# Patient Record
Sex: MALE | Race: WHITE | Employment: STUDENT | URBAN - METROPOLITAN AREA
[De-identification: names, ages, dates, MRNs, and addresses within clinical notes are randomized per-mention and may not be internally consistent; named-entity substitution may affect disease eponyms.]

---
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
## 2019-06-04 ENCOUNTER — HOSPITAL ENCOUNTER (EMERGENCY)
Age: 19
Discharge: HOME | End: 2019-06-04
Payer: COMMERCIAL

## 2019-06-04 VITALS
WEIGHT: 171 LBS | DIASTOLIC BLOOD PRESSURE: 76 MMHG | SYSTOLIC BLOOD PRESSURE: 134 MMHG | RESPIRATION RATE: 16 BRPM | TEMPERATURE: 99.3 F | HEART RATE: 112 BPM | OXYGEN SATURATION: 96 %

## 2019-06-04 DIAGNOSIS — J06.9 UPPER RESPIRATORY TRACT INFECTION, UNSPECIFIED TYPE: Primary | ICD-10-CM

## 2019-06-04 LAB
GROUP A STREP CULTURE, REFLEX: NEGATIVE
REFLEX THROAT C + S: NORMAL

## 2019-06-04 PROCEDURE — 99203 OFFICE O/P NEW LOW 30 MIN: CPT

## 2019-06-04 PROCEDURE — 87070 CULTURE OTHR SPECIMN AEROBIC: CPT

## 2019-06-04 PROCEDURE — 99203 OFFICE O/P NEW LOW 30 MIN: CPT | Performed by: NURSE PRACTITIONER

## 2019-06-04 PROCEDURE — 87880 STREP A ASSAY W/OPTIC: CPT

## 2019-06-04 RX ORDER — PREDNISOLONE 15 MG/5ML
20 SOLUTION ORAL DAILY
Qty: 33.5 ML | Refills: 0 | Status: SHIPPED | OUTPATIENT
Start: 2019-06-04 | End: 2019-06-09

## 2019-06-04 RX ORDER — CEFDINIR 250 MG/5ML
300 POWDER, FOR SUSPENSION ORAL 2 TIMES DAILY
Qty: 120 ML | Refills: 0 | Status: SHIPPED | OUTPATIENT
Start: 2019-06-04 | End: 2019-06-14

## 2019-06-04 SDOH — HEALTH STABILITY: MENTAL HEALTH: HOW OFTEN DO YOU HAVE A DRINK CONTAINING ALCOHOL?: NEVER

## 2019-06-04 ASSESSMENT — ENCOUNTER SYMPTOMS
VOMITING: 0
EYE REDNESS: 0
SORE THROAT: 1
COUGH: 1
EYE DISCHARGE: 0
NAUSEA: 0
SHORTNESS OF BREATH: 0

## 2019-06-04 ASSESSMENT — PAIN DESCRIPTION - LOCATION: LOCATION: THROAT

## 2019-06-04 ASSESSMENT — PAIN SCALES - GENERAL: PAINLEVEL_OUTOF10: 6

## 2019-06-04 ASSESSMENT — PAIN DESCRIPTION - PAIN TYPE: TYPE: ACUTE PAIN

## 2019-06-04 NOTE — ED PROVIDER NOTES
PROCEDURES:  None    FINAL IMPRESSION      1. Upper respiratory tract infection, unspecified type          DISPOSITION/ PLAN     Strep swab is negative at this time. I discussed the patient that this is likely an upper respiratory infection, however we will plan treat with oral antibiotics for his slight tonsillitis and erythematous throat. Patient will be placed on liquid medications as he states that he is unable to swallow pills. He is advised to use Tylenol and Motrin at home as needed and is agreeable plan as discussed. PATIENT REFERRED TO:  No primary care provider on file. No primary physician on file.       DISCHARGE MEDICATIONS:  New Prescriptions    CEFDINIR (OMNICEF) 250 MG/5ML SUSPENSION    Take 6 mLs by mouth 2 times daily for 10 days    PREDNISOLONE 15 MG/5ML SOLUTION    Take 6.7 mLs by mouth daily for 5 days       Discontinued Medications    No medications on file       Current Discharge Medication List          VARINDER Ferris CNP    (Please note that portions of this note were completed with a voice recognition program. Efforts were made to edit the dictations but occasionally words are mis-transcribed.)          VARINDER Ferris CNP  06/04/19 3653

## 2019-06-04 NOTE — ED TRIAGE NOTES
Pt walked to room 4. Pt here with complaints of a sore throat. Pt thinks strep. Started yesterday morning.

## 2019-06-06 LAB — THROAT/NOSE CULTURE: NORMAL

## 2021-12-29 ENCOUNTER — HOSPITAL ENCOUNTER (EMERGENCY)
Facility: HOSPITAL | Age: 21
Discharge: HOME | End: 2021-12-29
Attending: EMERGENCY MEDICINE
Payer: COMMERCIAL

## 2021-12-29 VITALS
HEIGHT: 69 IN | WEIGHT: 183 LBS | BODY MASS INDEX: 27.11 KG/M2 | OXYGEN SATURATION: 98 % | SYSTOLIC BLOOD PRESSURE: 134 MMHG | HEART RATE: 82 BPM | DIASTOLIC BLOOD PRESSURE: 86 MMHG | TEMPERATURE: 98 F | RESPIRATION RATE: 16 BRPM

## 2021-12-29 DIAGNOSIS — F40.10 SOCIAL ANXIETY DISORDER: ICD-10-CM

## 2021-12-29 DIAGNOSIS — F32.A DEPRESSION, UNSPECIFIED DEPRESSION TYPE: Primary | ICD-10-CM

## 2021-12-29 LAB
AMPHET UR QL SCN: NEGATIVE
AMPHET UR QL SCN: NEGATIVE
ANION GAP SERPL CALC-SCNC: 3 MMOL/L (ref 0–18)
BARBITURATES UR QL SCN: NEGATIVE
BASOPHILS # BLD AUTO: 0.02 X10(3) UL (ref 0–0.2)
BASOPHILS NFR BLD AUTO: 0.6 %
BENZODIAZ UR QL SCN: NEGATIVE
BENZODIAZ UR QL SCN: NEGATIVE
BUN BLD-MCNC: 12 MG/DL (ref 7–18)
BUN/CREAT SERPL: 13.2 (ref 10–20)
CALCIUM BLD-MCNC: 9.6 MG/DL (ref 8.5–10.1)
CANNABINOIDS UR QL SCN: NEGATIVE
CANNABINOIDS UR QL SCN: NEGATIVE
CHLORIDE SERPL-SCNC: 103 MMOL/L (ref 98–112)
CO2 SERPL-SCNC: 32 MMOL/L (ref 21–32)
COCAINE UR QL: NEGATIVE
COCAINE UR QL: NEGATIVE
CREAT BLD-MCNC: 0.91 MG/DL
CREAT UR-SCNC: 287 MG/DL
CREAT UR-SCNC: 297 MG/DL
DEPRECATED RDW RBC AUTO: 39.7 FL (ref 35.1–46.3)
EOSINOPHIL # BLD AUTO: 0.06 X10(3) UL (ref 0–0.7)
EOSINOPHIL NFR BLD AUTO: 1.7 %
ERYTHROCYTE [DISTWIDTH] IN BLOOD BY AUTOMATED COUNT: 11.6 % (ref 11–15)
ETHANOL SERPL-MCNC: <3 MG/DL (ref ?–3)
FLUAV + FLUBV RNA SPEC NAA+PROBE: NEGATIVE
FLUAV + FLUBV RNA SPEC NAA+PROBE: NEGATIVE
GLUCOSE BLD-MCNC: 90 MG/DL (ref 70–99)
HCT VFR BLD AUTO: 47.4 %
HGB BLD-MCNC: 16.4 G/DL
IMM GRANULOCYTES # BLD AUTO: 0.01 X10(3) UL (ref 0–1)
IMM GRANULOCYTES NFR BLD: 0.3 %
LYMPHOCYTES # BLD AUTO: 0.99 X10(3) UL (ref 1–4)
LYMPHOCYTES NFR BLD AUTO: 28.6 %
MCH RBC QN AUTO: 32 PG (ref 26–34)
MCHC RBC AUTO-ENTMCNC: 34.6 G/DL (ref 31–37)
MCV RBC AUTO: 92.6 FL
MDMA UR QL SCN: NEGATIVE
MDMA UR QL SCN: NEGATIVE
METHADONE UR QL SCN: NEGATIVE
MONOCYTES # BLD AUTO: 1.16 X10(3) UL (ref 0.1–1)
MONOCYTES NFR BLD AUTO: 33.5 %
NEUTROPHILS # BLD AUTO: 1.22 X10 (3) UL (ref 1.5–7.7)
NEUTROPHILS # BLD AUTO: 1.22 X10(3) UL (ref 1.5–7.7)
NEUTROPHILS NFR BLD AUTO: 35.3 %
OPIATES UR QL SCN: NEGATIVE
OPIATES UR QL SCN: NEGATIVE
OSMOLALITY SERPL CALC.SUM OF ELEC: 285 MOSM/KG (ref 275–295)
OXYCODONE UR QL SCN: NEGATIVE
OXYCODONE UR QL SCN: NEGATIVE
PCP UR QL SCN: NEGATIVE
PLATELET # BLD AUTO: 218 10(3)UL (ref 150–450)
POTASSIUM SERPL-SCNC: 3.7 MMOL/L (ref 3.5–5.1)
RBC # BLD AUTO: 5.12 X10(6)UL
RSV RNA SPEC NAA+PROBE: NEGATIVE
SARS-COV-2 RNA RESP QL NAA+PROBE: NOT DETECTED
SODIUM SERPL-SCNC: 138 MMOL/L (ref 136–145)
WBC # BLD AUTO: 3.5 X10(3) UL (ref 4–11)

## 2021-12-29 PROCEDURE — 80048 BASIC METABOLIC PNL TOTAL CA: CPT | Performed by: EMERGENCY MEDICINE

## 2021-12-29 PROCEDURE — 80307 DRUG TEST PRSMV CHEM ANLYZR: CPT | Performed by: EMERGENCY MEDICINE

## 2021-12-29 PROCEDURE — 0241U SARS-COV-2/FLU A AND B/RSV BY PCR (GENEXPERT): CPT | Performed by: EMERGENCY MEDICINE

## 2021-12-29 PROCEDURE — 99285 EMERGENCY DEPT VISIT HI MDM: CPT

## 2021-12-29 PROCEDURE — 36415 COLL VENOUS BLD VENIPUNCTURE: CPT

## 2021-12-29 PROCEDURE — 85025 COMPLETE CBC W/AUTO DIFF WBC: CPT | Performed by: EMERGENCY MEDICINE

## 2021-12-29 PROCEDURE — 82077 ASSAY SPEC XCP UR&BREATH IA: CPT | Performed by: EMERGENCY MEDICINE

## 2021-12-29 NOTE — ED PROVIDER NOTES
Patient Seen in: [facility] AND [facility] Emergency Department      History   Patient presents with:  Eval-P    Stated Complaint: eval-p    Subjective:   HPI    39-year-old male sent over by his primary doctor for further evaluation of increasing depressive tho Musculoskeletal: Normal range of motion. No edema or tenderness. Neurological: Alert and oriented to person, place, and time. Psychiatric: Slightly flat affect. He seems mildly depressed.   He does not voice suicidal ideation or thoughts at this time ------                     CBC W/ DIFFERENTIAL[[id number]]          Abnormal            Final result                 Please view results for these tests on the individual orders.    DRUG ABUSE PANEL 10 SCREEN                   MDM      Patient has been medic

## 2021-12-29 NOTE — ED NOTES
Taurus denied any suicidal plan, to writer, to Dr Paloma Cuellar and Dr. Dheeraj Major. [place] from [place], [address] [facility] that [facility] told her that he has had thoughts of cutting his wrist and made a cutting gesture. Went back to [facility].   Met with him individually and

## 2021-12-29 NOTE — BH LEVEL OF CARE ASSESSMENT
Crisis Evaluation Assessment    Shu Mayo YOB: 2000   Age 24year old MRN [id number]   Location [address] Attending Gonzalo Ray MD      Patient's legal sex: male  Patient identifies as: male  Jas to get him to get help. I think he needs some behavioral therapy and get checked out for a chemical imbalance. He does not have the ability to cope. He never has.  We have been encouraging him for years to get treatment, but I can not do it for him and h actually had any thoughts of killing yourself? (past 30 days): No  3. Have you been thinking about how you might kill yourself? (past 30 days): No  4. Have you had these thoughts and had some intention of acting on them? (past 30 days): No  5a.  Have you st have a firearm owner ID card?: No  Collateral for any access to means/firearms/weapons: no collateral obtained    Protective Factors:   Protective Factors: parents    Review of Psychiatric Systems:  Maricel Alcala reports symptoms of depression \"for a long while\" living in [place] since he started [employer] there. He still has an apartment there. Parents are described as supportive. Taurus as been staying with is parents recently. They have been encouraging him to move back home so he can save some money.   Taurus velazquez n Feelings: Sadness;Depressed;Calm  Appropriateness of Affect: Congruent to mood  Range of Affect: Normal  Stability of Affect: Stable  Attitude toward staff: Co-operative;Pleasant  Speech  Rate of Speech: Appropriate  Flow of Speech: Appropriate  Intensity medication. He plans to return to his apartment in [place] after the first of January. He has a part time job there and is looking for a job in his field of study.          Risk/Protective Factors  Protective Factors: parents    Level of Care Recommendations

## 2021-12-29 NOTE — ED QUICK NOTES
Pt stated that he has been depress for more than a year now but is getting worst lately, stated that he cannot control his anxiety and got tired of dealing with it, stated that he wish to end his life and he has thoughts of cutting his wrist. Pt has never

## 2021-12-29 NOTE — ED INITIAL ASSESSMENT (HPI)
Sent by Dr Winter Dias for worsening depression and SI. Patient states he's been feeling worsening depression for 1.5 years. This is the first time he's had suicidal thoughts. Denies having a plan or intent to act on thoughts. Denies drug or alcohol abuse.

## 2021-12-30 NOTE — ED QUICK NOTES
Pt noted for discharge as Dr Anais Belle and [facility], public safety notified to return pts belongings, pt made aware, denies any concern

## 2021-12-30 NOTE — ED QUICK NOTES
Pt dcd to home aox3, ambulatory, discharge instruction given and voices understanding,safety plan in place, resources given and pt voices clear understanding,denies any concern

## 2022-05-12 ENCOUNTER — APPOINTMENT (OUTPATIENT)
Dept: GENERAL RADIOLOGY | Age: 22
End: 2022-05-12
Attending: NURSE PRACTITIONER
Payer: COMMERCIAL

## 2022-05-12 ENCOUNTER — HOSPITAL ENCOUNTER (OUTPATIENT)
Age: 22
Discharge: HOME | End: 2022-05-12
Payer: COMMERCIAL

## 2022-05-12 VITALS
HEART RATE: 85 BPM | TEMPERATURE: 98 F | DIASTOLIC BLOOD PRESSURE: 78 MMHG | OXYGEN SATURATION: 97 % | RESPIRATION RATE: 26 BRPM | SYSTOLIC BLOOD PRESSURE: 108 MMHG

## 2022-05-12 DIAGNOSIS — S60.459A FOREIGN BODY OF FINGER: Primary | ICD-10-CM

## 2022-05-12 PROCEDURE — 73140 X-RAY EXAM OF FINGER(S): CPT | Performed by: NURSE PRACTITIONER

## 2022-05-12 PROCEDURE — 64450 NJX AA&/STRD OTHER PN/BRANCH: CPT

## 2022-05-12 PROCEDURE — 99213 OFFICE O/P EST LOW 20 MIN: CPT

## 2022-05-12 RX ORDER — CEPHALEXIN 500 MG/1
500 CAPSULE ORAL 3 TIMES DAILY
Qty: 21 CAPSULE | Refills: 0 | Status: SHIPPED | OUTPATIENT
Start: 2022-05-12 | End: 2022-05-19

## 2024-08-20 ENCOUNTER — HOSPITAL ENCOUNTER (OUTPATIENT)
Age: 24
Discharge: HOME | End: 2024-08-20
Attending: EMERGENCY MEDICINE
Payer: COMMERCIAL

## 2024-08-20 VITALS
HEART RATE: 90 BPM | RESPIRATION RATE: 18 BRPM | OXYGEN SATURATION: 100 % | DIASTOLIC BLOOD PRESSURE: 71 MMHG | SYSTOLIC BLOOD PRESSURE: 129 MMHG | TEMPERATURE: 99 F

## 2024-08-20 DIAGNOSIS — Z91.038 ALLERGY TO INSECT BITES: Primary | ICD-10-CM

## 2024-08-20 PROCEDURE — 99213 OFFICE O/P EST LOW 20 MIN: CPT

## 2024-08-20 PROCEDURE — 99212 OFFICE O/P EST SF 10 MIN: CPT

## 2024-08-20 RX ORDER — ESCITALOPRAM OXALATE 20 MG/1
20 TABLET ORAL DAILY
COMMUNITY

## 2024-08-20 NOTE — ED PROVIDER NOTES
Patient Seen in: [facility] [place]      History     Chief Complaint   Patient presents with    Insect Bite     Stated Complaint: Right arm bite    Subjective:   HPI    Patient is a 23-year-old male with no significant past medical history who presents now with redness and swelling after insect bites.  Patient states he initially experienced a bite on Thursday/Friday of last week on the right bicep area.  The patient states this area became significantly inflamed and reddened.  This lesion is now resolving.  However, the patient has new lesions on his left lower chest/abdominal area.  Patient denies any fever.    Objective:   Past Medical History:    Anxiety    Depression              History reviewed. No pertinent surgical history.             Social History     Socioeconomic History    Marital status: Single   Tobacco Use    Smoking status: Never    Smokeless tobacco: Never   Vaping Use    Vaping status: Never Used   Substance and Sexual Activity    Alcohol use: Never    Drug use: Never    Sexual activity: Not Currently              Review of Systems    Positive for stated Chief Complaint: Insect Bite    Other systems are as noted in HPI.  Constitutional and vital signs reviewed.      All other systems reviewed and negative except as noted above.    Physical Exam     ED Triage Vitals [08/20/24 1525]   /71   Pulse 90   Resp 18   Temp 98.8 °F (37.1 °C)   Temp src    SpO2 100 %   O2 Device None (Room air)       Current Vitals:   Vital Signs  BP: 129/71  Pulse: 90  Resp: 18  Temp: 98.8 °F (37.1 °C)    Oxygen Therapy  SpO2: 100 %  O2 Device: None (Room air)            Physical Exam    Constitutional: Well-developed well-nourished in no acute distress  Head: Normocephalic, no swelling or tenderness  Eyes: Nonicteric sclera, no conjunctival injection  ENT: TMs are clear and flat bilaterally.  There is no posterior pharyngeal erythema  Chest: Clear to auscultation, no tenderness  Cardiovascular: Regular rate  and rhythm without murmur  Abdomen: Soft, nontender and nondistended  Neurologic: Patient is awake, alert and oriented ×3.  The patient's motor strength is 5 out of 5 and symmetric in the upper and lower extremities bilaterally  Extremities: No focal swelling or tenderness  Skin: Healing lesion on the right bicep area with minimal erythema.  There is no induration or fluctuance.  There is minimal warmth to the touch.  The patient states this is improved.  Patient has newer lesions on the left abdominal/chest area with mild surrounding erythema.  Again, there is no induration or fluctuance.      ED Course   Labs Reviewed - No data to display          Probable localized allergic reaction to insect bites.  No indication of cellulitis.  Recommend antihistamines         MDM      Insect bite, localized allergic reaction versus cellulitis                                   Medical Decision Making      Disposition and Plan     Clinical Impression:  1. Allergy to insect bites         Disposition:  Discharge  8/20/2024  3:39 pm    Follow-up:  Miguelina Dickson MD  [address] [facility]  [address] [place] [address]  [phone]      As needed          Medications Prescribed:  Current Discharge Medication List

## 2024-08-20 NOTE — ED INITIAL ASSESSMENT (HPI)
Patient arrived ambulatory to room c/o an insect bite to the right forearm. Patient states he noticed it 2 days ago. Redness progressively worsening. No drainage. No fevers. No relief with otc benadryl topical cream. No fevers.

## (undated) NOTE — ED AVS SNAPSHOT
[facility] AND [facility] in [facility] [place] [address] [address]    Phone:  [phone]    Fax:  [phone]           [facility] Notice   MRN: [id number]    Department:  [facility] AND [facility] in [facility]   Date of Visit:  5/ as needed for pain\fever. Change out toothbrush after taking antibiotic for 24 hours. Cover mouth when coughing or sneezing. Wash hands often. Close primary care physician follow-up.        Discharge References/Attachments     PHARYNGITIS, STREP (CONFIR that Physician.   If you need additional assistance selecting a physician, you may call the [employer] [facility] Physician Referral and Class Registration line at [phone] or find a doctor online by visiting [email or web address].    IF THERE IS ANY [facility] [address]  [place] [address]) [phone]                Additional Information       We are concerned for your overall well being:    - If you are a smoker or have smoked in the last 12 months, we encourage you to explore op

## (undated) NOTE — LETTER
Date & Time: 4/26/2018, 1:31 PM  Patient: Carol Simons  Encounter Provider(s):    Cassandra Flanagan       To Whom It May Concern:    Carol Simons was seen and treated in our department on 4/26/2018. He should not return to school until 4/30/18.